# Patient Record
Sex: MALE | Race: WHITE | NOT HISPANIC OR LATINO | Employment: OTHER | URBAN - METROPOLITAN AREA
[De-identification: names, ages, dates, MRNs, and addresses within clinical notes are randomized per-mention and may not be internally consistent; named-entity substitution may affect disease eponyms.]

---

## 2019-05-15 PROBLEM — L40.50 PSORIATIC ARTHRITIS: Status: ACTIVE | Noted: 2019-05-15

## 2019-05-15 LAB
ALBUMIN: 3.7 G/DL (ref 3.4–5)
ALP ISOS SERPL LEV INH-CCNC: 111 U/L (ref 45–117)
ALT (SGPT): 23 U/L (ref 16–61)
ANION GAP SERPL CALC-SCNC: 7 MMOL/L (ref 4–14)
AST SERPL-CCNC: 23 U/L (ref 15–37)
BASOPHILS - REL (DIFF): 0.6 %
BASOPHILS NFR BLD: 0.04 K/UL (ref 0–0.1)
BILIRUB SERPL-MCNC: 0.6 MG/DL (ref 0.2–1)
BUN SERPL-MCNC: 19 MG/DL (ref 7–18)
BUN/CREAT RATIO: 16
CALCIUM SERPL-MCNC: 8.5 MG/DL (ref 8.5–10.1)
CHLORIDE SERPL-SCNC: 101 MMOL/L (ref 98–107)
CO2 SERPL-SCNC: 28 MMOL/L (ref 21–32)
CREAT SERPL-MCNC: 1.19 MG/DL (ref 0.6–1.3)
CRP,NON-CARDIAC: 1.22 MG/DL (ref 0–0.3)
EOSINOPHILS - ABS (DIFF): 0.17 K/UL (ref 0–0.5)
EOSINOPHILS - REL (DIFF): 2.6 %
ERYTHROCYTE [DISTWIDTH] IN BLOOD BY AUTOMATED COUNT: 13.4 % (ref 12.3–17)
GFR MDRD AF AMER: >60 ML/MIN
GFR MDRD NON AF AMER: >60 ML/MIN
GLUCOSE: 146 MG/DL (ref 74–106)
HCT VFR BLD AUTO: 47.3 % (ref 36.7–47.1)
HGB BLD-MCNC: 14.9 G/DL (ref 12.5–16.3)
IMM GRAN %: 0.2 % (ref 0–0.9)
IMMATURE GRANS (ABS): 0.01 K/UL (ref 0–0.1)
LYMPH #: 1.39 K/UL (ref 1–3)
LYMPH%: 21.6 %
MCH RBC QN AUTO: 28.4 PG (ref 24.3–33.2)
MCHC RBC AUTO-ENTMCNC: 31.5 G/DL (ref 29.6–33.8)
MCV RBC AUTO: 90.3 FL (ref 79.3–93.5)
MONO #: 0.68 K/UL (ref 0.3–1)
MONO%: 10.5 %
NEU %: 64.5 %
NEUTROPHILS ABSOLUTE: 4.16 K/UL (ref 1.8–7.8)
NUCLEATED RBC %: 0 % (ref 0–0.48)
NUCLEATED RBC ABSOLUTE: 0 K/UL
PLATELET COUNT: 255 K/UL (ref 159–386)
PMV BLD AUTO: 10.4 FL (ref 9.1–12.7)
POTASSIUM: 3.8 MMOL/L (ref 3.5–5.1)
RBC # BLD AUTO: 5.24 M/UL (ref 4.06–5.63)
RDW-SD: 43.7 FL (ref 37.5–49)
SODIUM: 136 MMOL/L (ref 136–145)
TOTAL PROTEIN: 7.8 G/DL (ref 6.4–8.2)
WBC # BLD AUTO: 6.45 K/UL (ref 3.6–11.2)

## 2019-05-17 LAB — T-SPOT.TB: NEGATIVE

## 2019-12-04 PROBLEM — M25.50 JOINT PAIN: Status: ACTIVE | Noted: 2019-12-04

## 2020-01-08 PROBLEM — M54.50 LOW BACK PAIN: Status: ACTIVE | Noted: 2020-01-08

## 2020-04-30 PROBLEM — M54.16 LUMBAR RADICULOPATHY: Status: ACTIVE | Noted: 2020-04-30

## 2020-04-30 PROBLEM — F11.20 OPIOID DEPENDENCE: Status: ACTIVE | Noted: 2020-04-30

## 2020-05-26 ENCOUNTER — NURSE TRIAGE (OUTPATIENT)
Dept: ADMINISTRATIVE | Facility: CLINIC | Age: 65
End: 2020-05-26

## 2020-05-26 NOTE — TELEPHONE ENCOUNTER
Called patient on behalf of [facility]. Pt denied developing any fever, cough or shortness of breath since the procedure.  Called at [phone].    Reason for Disposition   Follow-up call to recent contact and information only call, no triage required    Protocols used: INFORMATION ONLY CALL - NO TRIAGE-P-OH

## 2020-10-06 ENCOUNTER — PATIENT MESSAGE (OUTPATIENT)
Dept: RESEARCH | Facility: OTHER | Age: 65
End: 2020-10-06

## 2020-11-23 PROBLEM — M48.00 CENTRAL STENOSIS OF SPINAL CANAL: Status: ACTIVE | Noted: 2019-09-11

## 2020-11-23 PROBLEM — M47.819 FACET ARTHROPATHY, MULTILEVEL: Status: ACTIVE | Noted: 2019-07-17

## 2020-11-23 PROBLEM — G89.4 CHRONIC PAIN SYNDROME: Status: ACTIVE | Noted: 2017-09-20

## 2021-05-12 ENCOUNTER — PATIENT MESSAGE (OUTPATIENT)
Dept: RESEARCH | Facility: HOSPITAL | Age: 66
End: 2021-05-12

## 2021-09-03 PROBLEM — M54.9 BACK PAIN: Status: ACTIVE | Noted: 2021-09-03

## 2021-10-01 PROBLEM — G89.29 CHRONIC PAIN: Status: ACTIVE | Noted: 2021-10-01

## 2021-10-08 DIAGNOSIS — U07.1 COVID-19 VIRUS DETECTED: ICD-10-CM

## 2021-10-11 ENCOUNTER — PATIENT OUTREACH (OUTPATIENT)
Dept: INFECTIOUS DISEASES | Facility: HOSPITAL | Age: 66
End: 2021-10-11

## 2022-02-07 ENCOUNTER — SPECIALTY PHARMACY (OUTPATIENT)
Dept: PHARMACY | Facility: CLINIC | Age: 67
End: 2022-02-07
Payer: MEDICAID

## 2022-02-10 NOTE — TELEPHONE ENCOUNTER
Cosentyx PA approved from 02/10/2022 through 03/09/2022 for starter  PA number: [id number]--> starter dose    Cosentyx maintenance 03/10/2022 through 08/08/2022  PA: [id number]-->maintenance dose      Peer to peer done 2/10/2022. Harsha rodríguez approval in 5-10 mins

## 2022-02-10 NOTE — TELEPHONE ENCOUNTER
Brendan, this is Elidia Bain with [facility].  We are working on your prescription that your doctor has sent us. We will be working with your insurance to get this approved for you. We will be calling you along the way with updates on your medication.  If you have any questions, you can reach us at [phone].    Welcome call outcome: No answer/Unable to leave voicemail

## 2022-03-09 ENCOUNTER — SPECIALTY PHARMACY (OUTPATIENT)
Dept: PHARMACY | Facility: CLINIC | Age: 67
End: 2022-03-09
Payer: MEDICAID

## 2022-03-09 ENCOUNTER — PATIENT MESSAGE (OUTPATIENT)
Dept: PHARMACY | Facility: CLINIC | Age: 67
End: 2022-03-09
Payer: MEDICAID

## 2022-03-09 NOTE — TELEPHONE ENCOUNTER
Attempted call #3 regarding his Cosentyx initial consult. No answer; unable to leave VM. Sent [employer] message.

## 2022-03-16 ENCOUNTER — PATIENT MESSAGE (OUTPATIENT)
Dept: PHARMACY | Facility: CLINIC | Age: 67
End: 2022-03-16
Payer: MEDICAID

## 2022-03-16 NOTE — TELEPHONE ENCOUNTER
Attempted call #4 for Cosentyx; unable to leave a message. Patient read previous My [facility]ist message. Sent another My Dentist message. Cosentyx prior auth for the loading dose  on 3/9. Need to arrange shipment before 3/18 to avoid insurance issues. Will close out if no return call by next week.

## 2022-03-24 NOTE — TELEPHONE ENCOUNTER
Attempted call #5 regarding Cosentyx initial consult. Patient has read and responded to [facility]ASS messages but has not reached out for the Cosentyx initial consult. Closing referral until patient establishes contact.

## 2022-04-18 ENCOUNTER — PATIENT MESSAGE (OUTPATIENT)
Dept: ADMINISTRATIVE | Facility: OTHER | Age: 67
End: 2022-04-18
Payer: MEDICAID

## 2022-04-18 ENCOUNTER — PATIENT OUTREACH (OUTPATIENT)
Dept: ADMINISTRATIVE | Facility: OTHER | Age: 67
End: 2022-04-18
Payer: MEDICAID

## 2022-04-18 NOTE — PROGRESS NOTES
CHW - Outreach Attempt    Try to leave a voicemail message for Hernesto Garcia for initial outreach, but pt has a voicemail box that hasn't been set up on: 4/18/22.

## 2022-05-13 ENCOUNTER — PATIENT OUTREACH (OUTPATIENT)
Dept: ADMINISTRATIVE | Facility: OTHER | Age: 67
End: 2022-05-13
Payer: MEDICAID

## 2022-05-13 NOTE — PROGRESS NOTES
CHW - Case Closure    I spoke to Hernesto Garcia via telephone today.   Pt reported: patient declined.  Pt denied any additional needs at this time and agrees with episode closure at this time.  Provided Hernesto Garcia with my contact information and encouraged him to contact me if additional needs arise.